# Patient Record
Sex: FEMALE | Race: WHITE | NOT HISPANIC OR LATINO | URBAN - METROPOLITAN AREA
[De-identification: names, ages, dates, MRNs, and addresses within clinical notes are randomized per-mention and may not be internally consistent; named-entity substitution may affect disease eponyms.]

---

## 2019-01-17 ENCOUNTER — EVALUATION (OUTPATIENT)
Dept: PHYSICAL THERAPY | Facility: CLINIC | Age: 64
End: 2019-01-17
Payer: COMMERCIAL

## 2019-01-17 DIAGNOSIS — M22.2X1 PATELLOFEMORAL PAIN SYNDROME OF RIGHT KNEE: Primary | ICD-10-CM

## 2019-01-17 PROCEDURE — 97112 NEUROMUSCULAR REEDUCATION: CPT | Performed by: PHYSICAL THERAPIST

## 2019-01-17 PROCEDURE — 97162 PT EVAL MOD COMPLEX 30 MIN: CPT | Performed by: PHYSICAL THERAPIST

## 2019-01-17 RX ORDER — MELOXICAM 15 MG/1
15 TABLET ORAL DAILY
COMMUNITY

## 2019-01-17 NOTE — LETTER
2019    MD KEVIN Mercer 1  [facility]  [address] [address]    Patient: Brittaney Roman   YOB: 1955   Date of Visit: 2019     Encounter Diagnosis     ICD-10-CM    1  Patellofemoral pain syndrome of right knee M22 2X1        Dear Dr Val Liu:    Please review the attached Plan of Care from [facility] recent visit  Please verify that you agree therapy should continue by signing the attached document and sending it back to our office  If you have any questions or concerns, please don't hesitate to call  Sincerely,    Junior Garcia PT      Referring Provider:      I certify that I have read the below Plan of Care and certify the need for these services furnished under this plan of treatment while under my care  MD KEVIN Mercer 1  [facility] [address]: [phone]          PT Evaluation     Today's date: 2019  Patient name: Brittaney Roman  : 1955  MRN: [id number]  Referring provider: Ameya Johnson MD  Dx:   Encounter Diagnosis     ICD-10-CM    1  Patellofemoral pain syndrome of right knee M22 2X1                   Assessment  Assessment details: Brittaney Roman is a 59 y o  female referred with primary diagnosis of right knee patellofemoral irritation  Patient presents with the following functional limitations: Negotiates stairs step-to, antalgic gait, guarded movement of right knee  She demonstrates significant deficits in right knee ROM and has pain with WB and end-range knee flexion  Treatment to include: Manual therapy techniques,  neuromuscular control exercises, balance/proprioception training as appropriate, instruction in a comprehensive HEP, and modalities as needed    They will benefit from skilled PT services to address the above functional deficits and to decrease pain to promote a return to their premorbid level of function  Impairments: abnormal gait, abnormal or restricted ROM, activity intolerance and pain with function  Functional limitations: Negotiates stairs step-to, antalgic gait, guarded movement of right knee  Understanding of Dx/Px/POC: good   Prognosis: good    Goals  STG (3 weeks)  1  Patient will demonstrate right knee ROM WFL in order to improve quality of gait  2  Patient will report pain as a 5/10 at worst with activity  LTG (6 weeks)  1  Patient will report pain as a 0-1/10 at worst to ambulate community distances  2  Patient will negotiate stairs reciprocally with HR  3  Patient will ambulate community distances without an assistive device  4  Patient will be independent and compliant with a Hep in order to maintain gains made with skilled PT services    Plan  Patient would benefit from: skilled physical therapy  Planned modality interventions: cryotherapy and TENS  Planned therapy interventions: joint mobilization, manual therapy, neuromuscular re-education, gait training, therapeutic exercise, therapeutic activities, stretching, strengthening, postural training, patient education and home exercise program  Frequency: 2x week  Duration in weeks: 6  Plan of Care beginning date: 2019  Plan of Care expiration date: 2019  Treatment plan discussed with: patient        Subjective Evaluation    History of Present Illness  Date of onset: 2018  Mechanism of injury: Patient states she woke up on 18 and couldn't put pressure on her knee  She went to work and says that when she came home she couldn't bend her knee  The next day her knee felt worse  She went to the ED at [facility] and had x-rays  She was told she could have "water on the knee "  She was given a knee brace and sent home  She was referred to Dr Tremayne Simmons who then referred patient to outpatient PT services    Quality of life: fair    Pain  Current pain ratin  At best pain ratin  At worst pain rating: 10  Location: Right anterior knee  Quality: pulling, knife-like and sharp  Progression: no change    Social Support  Steps to enter house: yes (right HR)  10  Stairs in house: no   Lives in: apartment  Lives with: alone    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[place])  Exercise history: None      Diagnostic Tests  X-ray: abnormal    FCE comments: Uses bilateral axillary crutches with community ambulation  Holds onto furniture and walls when walking around in the home  Patient states her knee "gives" out when she is walking longer distances and doing a lot  Has limited how much she goes out  Denies locking  Denies numbness/tingling or weakness  Currently OOW  Only doing light housekeepingTreatments  Previous treatment: medication  Patient Goals  Patient goals for therapy: decreased pain and return to work  Patient goal: To walk normally  Objective     Static Posture     Knee   Knee (Right): Flexed  Observations     Right Knee   Positive for edema  Palpation     Right   No palpable tenderness to the distal biceps femoris, distal semimembranosus and distal semitendinosus  Tenderness     Right Knee   Tenderness in the medial patella  No tenderness in the inferior fat pad, inferior patella, lateral joint line, lateral patella, LCL (distal), LCL (proximal), MCL (distal), MCL (proximal), medial joint line and patellar tendon  Neurological Testing     Sensation     Knee     Right Knee   Intact: light touch     Active Range of Motion     Right Knee   Flexion: 121 degrees with pain  Extension: 0 degrees     Passive Range of Motion     Right Knee   Flexion: 129 degrees with pain  Extension: 0 degrees     Strength/Myotome Testing     Left Knee   Flexion: 4+  Extension: 4+  Quadriceps contraction: good    Right Knee   Flexion: 4+  Extension: 4  Quadriceps contraction: good    Tests     Right Knee   Positive patellar compression     Negative anterior drawer, anterior Lachman, Apley's compression, Apley's distraction, lateral Toñito, medial Toñito, pivot shift, posterior drawer, valgus stress test at 0 degrees and varus stress test at 0 degrees  Ambulation   Weight-Bearing Status   Assistive device used: crutches    Ambulation: Level Surfaces   Ambulation with assistive device: independent  Ambulation without assistive device: contact guard assist    Ambulation: Stairs   Ascend stairs: independent  Pattern: non-reciprocal  Railings: one rail  Descend stairs: independent  Pattern: non-reciprocal  Railings: one rail    Observational Gait   Gait: antalgic and asymmetric   Decreased walking speed and stride length  Quality of Movement During Gait     Knee    Knee (Right): Positive increased flexion during stance and stiff knee  Precautions None    Specialty Daily Treatment Diary     Manual         IASTM right distal quad and patellar tendon                                            Exercise Diary  1/17       Pt education PF joint pathology    Anatomy of knee       Bike        TG squats        Standing HS curls        GT with SPC        Seated LAQ        Supine heel slides with strap        QS        SLR        Seated HS stretch        Prone quad stretch with strap                                                                                    Modalities        CP prn right knee

## 2019-01-17 NOTE — PROGRESS NOTES
PT Evaluation     Today's date: 2019  Patient name: Fausto Zafar  : 1955  MRN: [id number]  Referring provider: Yessica Michael MD  Dx:   Encounter Diagnosis     ICD-10-CM    1  Patellofemoral pain syndrome of right knee M22 2X1                   Assessment  Assessment details: Fausto Zafar is a 59 y o  female referred with primary diagnosis of right knee patellofemoral irritation  Patient presents with the following functional limitations: Negotiates stairs step-to, antalgic gait, guarded movement of right knee  She demonstrates significant deficits in right knee ROM and has pain with WB and end-range knee flexion  Treatment to include: Manual therapy techniques,  neuromuscular control exercises, balance/proprioception training as appropriate, instruction in a comprehensive HEP, and modalities as needed  They will benefit from skilled PT services to address the above functional deficits and to decrease pain to promote a return to their premorbid level of function  Impairments: abnormal gait, abnormal or restricted ROM, activity intolerance and pain with function  Functional limitations: Negotiates stairs step-to, antalgic gait, guarded movement of right knee  Understanding of Dx/Px/POC: good   Prognosis: good    Goals  STG (3 weeks)  1  Patient will demonstrate right knee ROM WFL in order to improve quality of gait  2  Patient will report pain as a 5/10 at worst with activity  LTG (6 weeks)  1  Patient will report pain as a 0-1/10 at worst to ambulate community distances  2  Patient will negotiate stairs reciprocally with HR  3  Patient will ambulate community distances without an assistive device  4   Patient will be independent and compliant with a Hep in order to maintain gains made with skilled PT services    Plan  Patient would benefit from: skilled physical therapy  Planned modality interventions: cryotherapy and TENS  Planned therapy interventions: joint mobilization, manual therapy, neuromuscular re-education, gait training, therapeutic exercise, therapeutic activities, stretching, strengthening, postural training, patient education and home exercise program  Frequency: 2x week  Duration in weeks: 6  Plan of Care beginning date: 2019  Plan of Care expiration date: 2019  Treatment plan discussed with: patient        Subjective Evaluation    History of Present Illness  Date of onset: 2018  Mechanism of injury: Patient states she woke up on 18 and couldn't put pressure on her knee  She went to work and says that when she came home she couldn't bend her knee  The next day her knee felt worse  She went to the ED at [facility] and had x-rays  She was told she could have "water on the knee "  She was given a knee brace and sent home  She was referred to Dr Cecilia Haynes who then referred patient to outpatient PT services  Quality of life: fair    Pain  Current pain ratin  At best pain ratin  At worst pain rating: 10  Location: Right anterior knee  Quality: pulling, knife-like and sharp  Progression: no change    Social Support  Steps to enter house: yes (right HR)  10  Stairs in house: no   Lives in: apartment  Lives with: alone    Employment status: 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 [employer]  Currently [place])  Exercise history: None      Diagnostic Tests  X-ray: abnormal    FCE comments: Uses bilateral axillary crutches with community ambulation  Holds onto furniture and walls when walking around in the home  Patient states her knee "gives" out when she is walking longer distances and doing a lot  Has limited how much she goes out  Denies locking  Denies numbness/tingling or weakness  Currently [place]  Only doing light housekeepingTreatments  Previous treatment: medication  Patient Goals  Patient goals for therapy: decreased pain and return to work  Patient goal: To walk normally  Objective     Static Posture     Knee   Knee (Right): Flexed       Observations     Right Knee Positive for edema  Palpation     Right   No palpable tenderness to the distal biceps femoris, distal semimembranosus and distal semitendinosus  Tenderness     Right Knee   Tenderness in the medial patella  No tenderness in the inferior fat pad, inferior patella, lateral joint line, lateral patella, LCL (distal), LCL (proximal), MCL (distal), MCL (proximal), medial joint line and patellar tendon  Neurological Testing     Sensation     Knee     Right Knee   Intact: light touch     Active Range of Motion     Right Knee   Flexion: 121 degrees with pain  Extension: 0 degrees     Passive Range of Motion     Right Knee   Flexion: 129 degrees with pain  Extension: 0 degrees     Strength/Myotome Testing     Left Knee   Flexion: 4+  Extension: 4+  Quadriceps contraction: good    Right Knee   Flexion: 4+  Extension: 4  Quadriceps contraction: good    Tests     Right Knee   Positive patellar compression  Negative anterior drawer, anterior Lachman, Apley's compression, Apley's distraction, lateral Toñito, medial Toñito, pivot shift, posterior drawer, valgus stress test at 0 degrees and varus stress test at 0 degrees  Ambulation   Weight-Bearing Status   Assistive device used: crutches    Ambulation: Level Surfaces   Ambulation with assistive device: independent  Ambulation without assistive device: contact guard assist    Ambulation: Stairs   Ascend stairs: independent  Pattern: non-reciprocal  Railings: one rail  Descend stairs: independent  Pattern: non-reciprocal  Railings: one rail    Observational Gait   Gait: antalgic and asymmetric   Decreased walking speed and stride length  Quality of Movement During Gait     Knee    Knee (Right): Positive increased flexion during stance and stiff knee             Precautions None    Specialty Daily Treatment Diary     Manual         IASTM right distal quad and patellar tendon                                            Exercise Diary  1/17       Pt education PF joint pathology    Anatomy of knee       Bike        TG squats        Standing HS curls        GT with SPC        Seated LAQ        Supine heel slides with strap        QS        SLR        Seated HS stretch        Prone quad stretch with strap                                                                                    Modalities        CP prn right knee

## 2019-01-18 ENCOUNTER — TRANSCRIBE ORDERS (OUTPATIENT)
Dept: PHYSICAL THERAPY | Facility: CLINIC | Age: 64
End: 2019-01-18

## 2019-01-18 DIAGNOSIS — M22.2X1 PATELLOFEMORAL PAIN SYNDROME OF RIGHT KNEE: Primary | ICD-10-CM

## 2019-01-21 ENCOUNTER — OFFICE VISIT (OUTPATIENT)
Dept: PHYSICAL THERAPY | Facility: CLINIC | Age: 64
End: 2019-01-21
Payer: COMMERCIAL

## 2019-01-21 DIAGNOSIS — M22.2X1 PATELLOFEMORAL PAIN SYNDROME OF RIGHT KNEE: Primary | ICD-10-CM

## 2019-01-21 PROCEDURE — 97116 GAIT TRAINING THERAPY: CPT

## 2019-01-21 PROCEDURE — 97530 THERAPEUTIC ACTIVITIES: CPT

## 2019-01-21 PROCEDURE — 97110 THERAPEUTIC EXERCISES: CPT

## 2019-01-21 NOTE — PROGRESS NOTES
Daily Note     Today's date: 2019  Patient name: Adis Piper  : 1955  MRN: [id number]  Referring provider: Negrito Tran MD  Dx:   Encounter Diagnosis     ICD-10-CM    1  Patellofemoral pain syndrome of right knee M22 2X1                   Subjective: Pt denies any pain upon arrival today  She states that she is having more discomfort than pain is attempting walking without her crutches  Objective: See treatment diary below  Precautions None     Specialty Daily Treatment Diary      Manual              IASTM right distal quad and patellar tendon                                                                           Exercise Diary           Pt education PF joint pathology  Anatomy of knee           Bike    10'         TG squats             Standing HS curls    20x         GT with SPC    200' with vcs         Seated LAQ    20x         Supine heel slides with strap    10" 10x         QS    5" 20x         SLR    20x         Seated HS stretch    30" 3x         Prone quad stretch with strap    30" 3x                                                                                                                                             Modalities             CP prn right knee                                                   Assessment: Pt had no pain throughout nor post treatment today  She required multiple vcs to WB in her RLE during ambulation  She does not use R knee flexion during gait without vcs  She was educated on two point gait pattern using a SPC to promote WB and increase safety awareness  Pt was advised to use a cane instead of crutches outside of therapy  She noted the possibility of using a QC as she feels as if she'd have more support  She was educated on HEP including the importance of R knee flexion to decrease risk of loss of ROM  Plan: Progress treatment as tolerated

## 2019-01-23 ENCOUNTER — OFFICE VISIT (OUTPATIENT)
Dept: PHYSICAL THERAPY | Facility: CLINIC | Age: 64
End: 2019-01-23
Payer: COMMERCIAL

## 2019-01-23 DIAGNOSIS — M22.2X1 PATELLOFEMORAL PAIN SYNDROME OF RIGHT KNEE: Primary | ICD-10-CM

## 2019-01-23 PROCEDURE — 97530 THERAPEUTIC ACTIVITIES: CPT

## 2019-01-23 PROCEDURE — 97116 GAIT TRAINING THERAPY: CPT

## 2019-01-23 PROCEDURE — 97110 THERAPEUTIC EXERCISES: CPT

## 2019-01-23 NOTE — PROGRESS NOTES
Daily Note     Today's date: 2019  Patient name: Brittaney Roman  : 1955  MRN: [id number]  Referring provider: Ameya Johnson MD  Dx:   Encounter Diagnosis     ICD-10-CM    1  Patellofemoral pain syndrome of right knee M22 2X1                   Subjective: Pt denies any pain upon arrival today  She states that she is having some discomfort but no pain  Objective: See treatment diary below  Precautions None     Specialty Daily Treatment Diary      Manual              IASTM right distal quad and patellar tendon                                                                           Exercise Diary         Pt education PF joint pathology   Anatomy of knee    gait training and WB       Bike    10'  Nustep L5 10'       TG squats             Standing HS curls    20x  20x       GT with SPC    200' with vcs  300' with vcs       Seated LAQ    20x  20x       Supine heel slides with strap    10" 10x  10" 10x       QS    5" 20x  5" 20x       SLR    20x  20x       Seated HS stretch    30" 3x  30" 3x       Prone quad stretch with strap    30" 3x  30" 3x        rocker board L      20x                                                                                                                             Modalities             CP prn right knee                                                Assessment: Pt had no pain post treatment despite noting some ms soreness  She was educated on the importance of WB equally to decrease risk of further injury including risk of falls  She was educated on DOMS due to increase in WB today  Plan: Progress treatment as tolerated

## 2019-01-28 ENCOUNTER — OFFICE VISIT (OUTPATIENT)
Dept: PHYSICAL THERAPY | Facility: CLINIC | Age: 64
End: 2019-01-28
Payer: COMMERCIAL

## 2019-01-28 DIAGNOSIS — M22.2X1 PATELLOFEMORAL PAIN SYNDROME OF RIGHT KNEE: Primary | ICD-10-CM

## 2019-01-28 PROCEDURE — 97530 THERAPEUTIC ACTIVITIES: CPT

## 2019-01-28 PROCEDURE — 97110 THERAPEUTIC EXERCISES: CPT

## 2019-01-28 NOTE — PROGRESS NOTES
Daily Note     Today's date: 2019  Patient name: Latasha Orellana  : 1955  MRN: [id number]  Referring provider: Sumeet Thomson MD  Dx:   Encounter Diagnosis     ICD-10-CM    1  Patellofemoral pain syndrome of right knee M22 2X1                   Subjective: Pt denies any pain upon arrival today  She states that today was her first day back at work and she is tired but not having any pain  Objective: See treatment diary below  Precautions None     Specialty Daily Treatment Diary      Manual              IASTM right distal quad and patellar tendon                                                                           Exercise Diary       Pt education PF joint pathology   Anatomy of knee    gait training and WB       Bike    10'  Nustep L5 10'  Nustep L5 10'     TG squats             Standing HS curls    20x  20x  20x     GT with SPC    200' with vcs  300' with vcs       Seated LAQ    20x  20x  20x     Supine heel slides with strap    10" 10x  10" 10x  10" 10x     QS    5" 20x  5" 20x  5" 20x     SLR    20x  20x  20x     Seated HS stretch    30" 3x  30" 3x  30" 3x     Prone quad stretch with strap    30" 3x  30" 3x  30" 3x      rocker board L      20x                                                                                                                             Modalities             CP prn right knee                                                Assessment: Pt had no pain throughout nor post treatment today  She was educated on proper shoes including not wearing heels and wearing flat shoes to decrease pain and risk of falls  She was advised to continue using the [facility] for ambulation to increase safety awareness  Plan: Progress treatment as tolerated

## 2019-01-29 ENCOUNTER — APPOINTMENT (OUTPATIENT)
Dept: PHYSICAL THERAPY | Facility: CLINIC | Age: 64
End: 2019-01-29
Payer: COMMERCIAL

## 2019-01-31 ENCOUNTER — OFFICE VISIT (OUTPATIENT)
Dept: PHYSICAL THERAPY | Facility: CLINIC | Age: 64
End: 2019-01-31
Payer: COMMERCIAL

## 2019-01-31 DIAGNOSIS — M22.2X1 PATELLOFEMORAL PAIN SYNDROME OF RIGHT KNEE: Primary | ICD-10-CM

## 2019-01-31 PROCEDURE — 97530 THERAPEUTIC ACTIVITIES: CPT

## 2019-01-31 PROCEDURE — 97112 NEUROMUSCULAR REEDUCATION: CPT

## 2019-01-31 PROCEDURE — 97110 THERAPEUTIC EXERCISES: CPT

## 2019-01-31 NOTE — PROGRESS NOTES
Daily Note     Today's date: 2019  Patient name: Trey Ureña  : 1955  MRN: [id number]  Referring provider: Zeny Estrada MD  Dx:   Encounter Diagnosis     ICD-10-CM    1  Patellofemoral pain syndrome of right knee M22 2X1                   Subjective: Pt c/o 4/10 pain in R knee today  She states that her knee is cracking and it is stiff from the cold  Objective: See treatment diary below  Precautions None     Specialty Daily Treatment Diary      Manual              IASTM right distal quad and patellar tendon                                                                           Exercise Diary     Pt education PF joint pathology   Anatomy of knee    gait training and WB       Bike    10'  Nustep L5 10'  Nustep L5 10'  Nustep L5   TG squats          20x   Standing HS curls    20x  20x  20x  20x   GT with SPC    200' with vcs  300' with vcs       Seated LAQ    20x  20x  20x  20x   Supine heel slides with strap    10" 10x  10" 10x  10" 10x  10" 10x   QS    5" 20x  5" 20x  5" 20x  5" 20x   SLR    20x  20x  20x  20x   Seated HS stretch    30" 3x  30" 3x  30" 3x  30" 3x   Prone quad stretch with strap    30" 3x  30" 3x  30" 3x  30" 3x    rocker board L      20x                                                                                                                             Modalities             CP prn right knee                                                   Assessment: Pt had no pain post treatment today  She noted feeling as if the stretches really helped to decrease her tension  She was advised to keep an eye on her "knee cracking" symptoms and if they persist with pain to contact her doctor  She was educated on HEP including stretches to decrease pain outside of therapy  Plan: Progress treatment as tolerated

## 2019-02-05 ENCOUNTER — OFFICE VISIT (OUTPATIENT)
Dept: PHYSICAL THERAPY | Facility: CLINIC | Age: 64
End: 2019-02-05
Payer: COMMERCIAL

## 2019-02-05 DIAGNOSIS — M22.2X1 PATELLOFEMORAL PAIN SYNDROME OF RIGHT KNEE: Primary | ICD-10-CM

## 2019-02-05 PROCEDURE — 97530 THERAPEUTIC ACTIVITIES: CPT

## 2019-02-05 PROCEDURE — 97110 THERAPEUTIC EXERCISES: CPT

## 2019-02-05 PROCEDURE — 97112 NEUROMUSCULAR REEDUCATION: CPT

## 2019-02-05 NOTE — PROGRESS NOTES
Daily Note     Today's date: 2019  Patient name: Jett Arrieta  : 1955  MRN: [id number]  Referring provider: Cruz Solorio MD  Dx:   Encounter Diagnosis     ICD-10-CM    1  Patellofemoral pain syndrome of right knee M22 2X1                   Subjective: Pt c/o 6/10 pain in R knee today  She states that she has been standing more at work and not using the cane as much  Objective: See treatment diary below  Precautions None     Specialty Daily Treatment Diary      Manual              IASTM right distal quad and patellar tendon                                                                           Exercise Diary     Pt education PF joint pathology   Anatomy of knee    gait training and WB       Bike  Nustep L5 10'  10'  Nustep L5 10'  Nustep L5 10'  Nustep L5   TG squats  NP        20x   Standing HS curls  20x  20x  20x  20x  20x   GT with SPC    200' with vcs  300' with vcs       Seated LAQ  20x  20x  20x  20x  20x   Supine heel slides with strap  10" 10x  10" 10x  10" 10x  10" 10x  10" 10x   QS  5" 20x  5" 20x  5" 20x  5" 20x  5" 20x   SLR  20x  20x  20x  20x  20x   Seated HS stretch  30" 3x standing today  30" 3x  30" 3x  30" 3x  30" 3x   Prone quad stretch with strap  30" 3x  30" 3x  30" 3x  30" 3x  30" 3x    rocker board L      20x        step ups   F only 4" 20x                                                                                                                   Modalities             CP prn right knee                                                Assessment: Pt had no pain post treatment today  She required multiple vcs to perform knee flexion during HS curls without hip flexion  She was educated on how to perform both HS and quad stretch at work to limit stiffness and tension in R knee after long durations of standing  She was advised to move her RLE as much as possible to decrease risk of stiffness   Pt noted feeling the best she has felt in a week upon leaving today  Plan: Continue per plan of care  Progress treatment as tolerated

## 2019-02-07 ENCOUNTER — APPOINTMENT (OUTPATIENT)
Dept: PHYSICAL THERAPY | Facility: CLINIC | Age: 64
End: 2019-02-07
Payer: COMMERCIAL

## 2019-02-12 ENCOUNTER — APPOINTMENT (OUTPATIENT)
Dept: PHYSICAL THERAPY | Facility: CLINIC | Age: 64
End: 2019-02-12
Payer: COMMERCIAL

## 2019-02-13 ENCOUNTER — OFFICE VISIT (OUTPATIENT)
Dept: PHYSICAL THERAPY | Facility: CLINIC | Age: 64
End: 2019-02-13
Payer: COMMERCIAL

## 2019-02-13 DIAGNOSIS — M22.2X1 PATELLOFEMORAL PAIN SYNDROME OF RIGHT KNEE: Primary | ICD-10-CM

## 2019-02-13 PROCEDURE — 97112 NEUROMUSCULAR REEDUCATION: CPT

## 2019-02-13 PROCEDURE — 97110 THERAPEUTIC EXERCISES: CPT

## 2019-02-13 NOTE — PROGRESS NOTES
Daily Note     Today's date: 2019  Patient name: Tracie Urbina  : 1955  MRN: [id number]  Referring provider: Max Champion MD  Dx:   Encounter Diagnosis     ICD-10-CM    1  Patellofemoral pain syndrome of right knee M22 2X1                   Subjective: Pt denies any pain upon arrival today  She states that she is feeling good despite getting stiff sometimes and she is no longer using the cane  Objective: See treatment diary below  Precautions None     Specialty Daily Treatment Diary      Manual              IASTM right distal quad and patellar tendon                                                                           Exercise Diary     Pt education PF joint pathology   Anatomy of knee    gait training and WB       Bike  Nustep L5 10'  Nustep L5 10'  Nustep L5 10'  Nustep L5 10'  Nustep L5   TG squats  NP  20x      20x   Standing HS curls  20x  20x 1#  20x  20x  20x   GT with SPC      300' with vcs       Seated LAQ  20x  20x 1#  20x  20x  20x   Supine heel slides with strap  10" 10x  10" 10x  10" 10x  10" 10x  10" 10x   QS  5" 20x  5" 20x  5" 20x  5" 20x  5" 20x   SLR  20x  20x 1#  20x  20x  20x   Seated HS stretch  30" 3x standing today  30" 3x standing  30" 3x  30" 3x  30" 3x   Prone quad stretch with strap  30" 3x  30" 3x  30" 3x  30" 3x  30" 3x    rocker board L      20x        step ups   F only 4" 20x  step ups 4" 20x                                                                                                                 Modalities             CP prn right knee                                               Assessment: Pt tolerated increase in intensity well today without pain  She will have a re-evaluation next session and may DC to [facility]  Plan: Potential discharge next visit

## 2019-02-14 ENCOUNTER — EVALUATION (OUTPATIENT)
Dept: PHYSICAL THERAPY | Facility: CLINIC | Age: 64
End: 2019-02-14
Payer: COMMERCIAL

## 2019-02-14 DIAGNOSIS — M22.2X1 PATELLOFEMORAL PAIN SYNDROME OF RIGHT KNEE: Primary | ICD-10-CM

## 2019-02-14 PROCEDURE — 97112 NEUROMUSCULAR REEDUCATION: CPT | Performed by: PHYSICAL THERAPIST

## 2019-02-14 NOTE — PROGRESS NOTES
PT Re-Evaluation     Today's date: 2019  Patient name: Leonie May  : 1955  MRN: [id number]  Referring provider: Morelia Duval MD  Dx:   Encounter Diagnosis     ICD-10-CM    1  Patellofemoral pain syndrome of right knee M22 2X1                   Assessment  Assessment details: Patient reports feeling 60% improved to date  She has decreased frequency and intensity of pain, although pain persists with WB and ambulation  She is now able to negotiate stairs reciprocally  She demonstrates normal strength and ROM in her right knee  Patient is scheduled to have an MRI of her right knee and physician has requested she hold PT services until follow-up the end of next week  Impairments: abnormal gait, abnormal or restricted ROM, activity intolerance and pain with function  Functional limitations: Decreased knee flexion with swing phase of gaitUnderstanding of Dx/Px/POC: good   Prognosis: good    Goals  STG (3 weeks)  1  Patient will demonstrate right knee ROM WFL in order to improve quality of gait - met  2  Patient will report pain as a 5/10 at worst with activity - not met  LTG (6 weeks)  1  Patient will report pain as a 0-1/10 at worst to ambulate community distances - not met  2  Patient will negotiate stairs reciprocally with HR - met  3  Patient will ambulate community distances without an assistive device - met  4   Patient will be independent and compliant with a Hep in order to maintain gains made with skilled PT services - met    Plan  Patient would benefit from: skilled physical therapy  Planned modality interventions: cryotherapy and TENS  Planned therapy interventions: joint mobilization, manual therapy, neuromuscular re-education, gait training, therapeutic exercise, therapeutic activities, stretching, strengthening, postural training, patient education and home exercise program  Frequency: 2x week  Duration in weeks: 4  Plan of Care beginning date: 2019  Plan of Care expiration date: 3/14/2019  Treatment plan discussed with: patient        Subjective Evaluation    History of Present Illness  Date of onset: 2018  Mechanism of injury: No pain at rest and when sitting  Has pain with WB and walking  Has stopped using the [facility] with ambulation  Negotiates stairs reciprocally  Patient feels 60% improved since starting physical therapy  No pain getting out of bed  Has resumed normal work activities  Avoids heavy lifting  Not a recurrent problem   Quality of life: fair    Pain  Current pain ratin  At best pain ratin  At worst pain ratin  Location: Right anterior knee  Quality: pulling, knife-like and sharp  Progression: improved    Social Support  Steps to enter house: yes (right HR)  10  Stairs in house: no   Lives in: apartment  Lives with: alone    Employment status: working ([address] [employer] desk at [employer]  Currently [place])  Exercise history: None      Diagnostic Tests  X-ray: abnormal    FCE comments:  Patient states her knee no longer "gives" out when she is walking  Patient doesn't feel any stronger than when she started  Pain increases when picking something up to carry it  Denies locking  Denies numbness/tingling or weakness  Only doing light housekeeping  Pain with carrying laundry at home  Treatments  Previous treatment: medication  Patient Goals  Patient goals for therapy: decreased pain and return to work  Patient goal: To walk normally  Objective     Static Posture     Knee   Knee (Right): Flexed  Observations     Right Knee   Negative for edema  Palpation     Right   No palpable tenderness to the distal biceps femoris, distal semimembranosus and distal semitendinosus  Tenderness     Right Knee   Tenderness in the medial patella  No tenderness in the inferior fat pad, inferior patella, lateral joint line, lateral patella, LCL (distal), LCL (proximal), MCL (distal), MCL (proximal), medial joint line and patellar tendon       Neurological Testing     Sensation     Knee     Right Knee   Intact: light touch     Active Range of Motion     Right Knee   Flexion: 135 degrees with pain  Extension: 0 degrees     Passive Range of Motion     Right Knee   Normal passive range of motion    Strength/Myotome Testing     Left Knee   Flexion: 4+  Extension: 4+  Quadriceps contraction: good    Right Knee   Flexion: 4+  Extension: 4+  Quadriceps contraction: good    Tests     Right Knee   Negative anterior drawer, anterior Lachman, Apley's compression, Apley's distraction, lateral Toñito, medial Toñito, patellar compression, pivot shift, posterior drawer, valgus stress test at 0 degrees and varus stress test at 0 degrees  Ambulation   Weight-Bearing Status   Assistive device used: none    Ambulation: Level Surfaces   Ambulation without assistive device: independent    Ambulation: Stairs   Ascend stairs: independent  Pattern: reciprocal  Railings: one rail  Descend stairs: independent  Pattern: reciprocal  Railings: one rail    Observational Gait   Gait: asymmetric   Decreased walking speed and stride length  Quality of Movement During Gait     Knee    Knee (Right): Positive stiff knee             Precautions None     Specialty Daily Treatment Diary      Manual              IASTM right distal quad and patellar tendon                                                                           Exercise Diary  2/5 2/13 2/14 1/28 1/31   Pt education PF joint pathology   Anatomy of knee    gait training and WB       Bike  Nustep L5 10'  Nustep L5 10'  Held  Nustep L5 10'  Nustep L5   TG squats  NP  20x  Held    20x   Standing HS curls  20x  20x 1# Reviewed  20x  20x   GT with SPC             Seated LAQ  20x  20x 1# Reviewed  20x  20x   Supine heel slides with strap  10" 10x  10" 10x  Reviewed  10" 10x  10" 10x   QS  5" 20x  5" 20x Reviewed  5" 20x  5" 20x   SLR  20x  20x 1# Reviewed  20x  20x   Seated HS stretch  30" 3x standing today  30" 3x standing  Reviewed  30" 3x  30" 3x   Prone quad stretch with strap  30" 3x  30" 3x  Reviewed  30" 3x  30" 3x    rocker board L     Reviewed        step ups   F only 4" 20x  step ups 4" 20x                                                                                                                 Modalities             CP prn right knee

## 2019-02-19 ENCOUNTER — APPOINTMENT (OUTPATIENT)
Dept: PHYSICAL THERAPY | Facility: CLINIC | Age: 64
End: 2019-02-19
Payer: COMMERCIAL

## 2019-02-21 ENCOUNTER — APPOINTMENT (OUTPATIENT)
Dept: PHYSICAL THERAPY | Facility: CLINIC | Age: 64
End: 2019-02-21
Payer: COMMERCIAL